# Patient Record
Sex: MALE | Race: WHITE | NOT HISPANIC OR LATINO | Employment: FULL TIME | URBAN - METROPOLITAN AREA
[De-identification: names, ages, dates, MRNs, and addresses within clinical notes are randomized per-mention and may not be internally consistent; named-entity substitution may affect disease eponyms.]

---

## 2024-12-02 ENCOUNTER — HOSPITAL ENCOUNTER (OUTPATIENT)
Dept: RADIOLOGY | Facility: CLINIC | Age: 54
Discharge: HOME | End: 2024-12-02
Payer: COMMERCIAL

## 2024-12-02 ENCOUNTER — OFFICE VISIT (OUTPATIENT)
Dept: ORTHOPEDIC SURGERY | Facility: CLINIC | Age: 54
End: 2024-12-02
Payer: COMMERCIAL

## 2024-12-02 DIAGNOSIS — M25.522 LEFT ELBOW PAIN: ICD-10-CM

## 2024-12-02 DIAGNOSIS — M25.512 ACUTE PAIN OF LEFT SHOULDER: ICD-10-CM

## 2024-12-02 DIAGNOSIS — S46.012A ROTATOR CUFF STRAIN, LEFT, INITIAL ENCOUNTER: ICD-10-CM

## 2024-12-02 DIAGNOSIS — S56.912A ELBOW STRAIN, LEFT, INITIAL ENCOUNTER: ICD-10-CM

## 2024-12-02 PROCEDURE — 73080 X-RAY EXAM OF ELBOW: CPT | Mod: LT

## 2024-12-02 PROCEDURE — 73030 X-RAY EXAM OF SHOULDER: CPT | Mod: LT

## 2024-12-02 PROCEDURE — 73030 X-RAY EXAM OF SHOULDER: CPT | Mod: LEFT SIDE | Performed by: STUDENT IN AN ORGANIZED HEALTH CARE EDUCATION/TRAINING PROGRAM

## 2024-12-02 PROCEDURE — 99214 OFFICE O/P EST MOD 30 MIN: CPT | Performed by: STUDENT IN AN ORGANIZED HEALTH CARE EDUCATION/TRAINING PROGRAM

## 2024-12-02 PROCEDURE — 99204 OFFICE O/P NEW MOD 45 MIN: CPT | Performed by: STUDENT IN AN ORGANIZED HEALTH CARE EDUCATION/TRAINING PROGRAM

## 2024-12-02 PROCEDURE — 73080 X-RAY EXAM OF ELBOW: CPT | Mod: LEFT SIDE | Performed by: STUDENT IN AN ORGANIZED HEALTH CARE EDUCATION/TRAINING PROGRAM

## 2024-12-02 RX ORDER — METHYLPREDNISOLONE 4 MG/1
TABLET ORAL
Qty: 1 EACH | Refills: 0 | Status: SHIPPED | OUTPATIENT
Start: 2024-12-02

## 2024-12-02 NOTE — PROGRESS NOTES
Acute Injury New Patient Visit    HPI: Aldo is a 54 y.o.male who presents today with new complaints of left arm injury.  States that yesterday on 12/1/2024, he was walking down the steps, fell down directly onto his elbow, and had immediate pain in the posterior elbow.  This pain radiates up towards the anterior shoulder.  He notes some popping and clicking in the shoulder.  He denies any swelling and bruising about the shoulder, but does note some mild swelling about the posterior elbow.  He denies any numbness and tingling.    Plan: For this left elbow contusion, left tricep strain, and left shoulder strain as well as left rotator cuff strain, we will place him in a sling for a few days that he has from home, start him on a Medrol Dosepak, start in physical therapy, follow-up in 2 to 3 weeks.  Continue other conservative treat measures.    Assessment:   Problem List Items Addressed This Visit    None  Visit Diagnoses       Acute pain of left shoulder        Relevant Orders    XR shoulder left 2+ views    Left elbow pain        Relevant Orders    XR elbow left 3+ views    Rotator cuff strain, left, initial encounter        Relevant Medications    methylPREDNISolone (Medrol Dospak) 4 mg tablets    Other Relevant Orders    Sling    Referral to Physical Therapy    Elbow strain, left, initial encounter        Relevant Medications    methylPREDNISolone (Medrol Dospak) 4 mg tablets    Other Relevant Orders    Sling    Referral to Physical Therapy            Diagnostics: Reviewed all relevant imaging including x-ray, MRI, CT, and US.      Procedure:  Procedures    Physical Exam:  GENERAL:  No obvious acute distress.  NEURO:  Distally neurovascularly intact.  Sensation intact to light touch.  Extremity: Left shoulder exam shows:  Skin is intact;  No erythema or warmth;  No edema or ecchymosis;  No clinical signs of infection;  Can forward flex to 180 degrees;  Abduction to 180 degrees;  External rotation from  neutral at 75 degrees;  Internal rotation at the level of T10;  POSITIVE signs of impingement with Bolanos or Neer's test;  Negative empty can test;  Negative crossarm test;  No pain over the AC joint;  Negative [place]'s test;  Negative sulcus sign;  Negative anterior apprehension's test; and  Neurovascularly intact.    Exam of the left elbow:  Skin is intact with no signs of infection;  TENDER over the distal triceps insertion at the olecranon;  No swelling or bruising;  No erythema or warmth;  No tenderness overlying the lateral epicondyle;  No tenderness overlying the medial epicondyle;  No pain with resisted extension at the wrist;  No pain with supination;  No pain with flexion;  Negative hook test;        Orders Placed This Encounter    Sling    XR shoulder left 2+ views    XR elbow left 3+ views    Referral to Physical Therapy    methylPREDNISolone (Medrol Dospak) 4 mg tablets      At the conclusion of the visit there were no further questions by the patient/family regarding their plan of care.  Patient was instructed to call or return with any issues, questions, or concerns regarding their injury and/or treatment plan described above.     12/02/24 at 4:38 PM - Edwin Da Silva DO    Office: [phone]    This note was prepared using voice recognition software.  The details of this note are correct and have been reviewed, and corrected to the best of my ability.  Some grammatical errors may persist related to the Dragon software.

## 2024-12-12 ENCOUNTER — OFFICE VISIT (OUTPATIENT)
Dept: ORTHOPEDIC SURGERY | Facility: CLINIC | Age: 54
End: 2024-12-12
Payer: COMMERCIAL

## 2024-12-12 DIAGNOSIS — S46.012A ROTATOR CUFF STRAIN, LEFT, INITIAL ENCOUNTER: Primary | ICD-10-CM

## 2024-12-12 PROCEDURE — 99213 OFFICE O/P EST LOW 20 MIN: CPT | Performed by: STUDENT IN AN ORGANIZED HEALTH CARE EDUCATION/TRAINING PROGRAM

## 2024-12-12 RX ORDER — CYCLOBENZAPRINE HCL 5 MG
5 TABLET ORAL NIGHTLY
Qty: 7 TABLET | Refills: 0 | Status: SHIPPED | OUTPATIENT
Start: 2024-12-12 | End: 2024-12-19

## 2024-12-12 RX ORDER — NAPROXEN 500 MG/1
500 TABLET ORAL
Qty: 28 TABLET | Refills: 1 | Status: SHIPPED | OUTPATIENT
Start: 2024-12-12 | End: 2025-01-09

## 2024-12-12 NOTE — PROGRESS NOTES
Established follow-up patient Visit    HPI: Aldo is a 54 y.o.male who presents today for follow-up of his left arm injury.  He states that his elbow is almost all better.  He has no pain.  He does have the occasional sharp pain across the anterior shoulder with certain movements.  He is set to start physical therapy on 12/23/2024.  He took the Medrol Dosepak.  This helped a little bit.  He wear the sling for 5 days.    On 12/2/2024, he presented with new complaints of left arm injury.  States that yesterday on 12/1/2024, he was walking down the steps, fell down directly onto his elbow, and had immediate pain in the posterior elbow.  This pain radiates up towards the anterior shoulder.  He notes some popping and clicking in the shoulder.  He denies any swelling and bruising about the shoulder, but does note some mild swelling about the posterior elbow.  He denies any numbness and tingling.    Plan: Today, on 12/12/2024, he will start physical therapy, home exercises, start cyclobenzaprine, naproxen, for his left rotator cuff tendinitis and follow-up in 4 to 5 weeks.  If he is miserable before then, I would consider corticosteroid injection.    On 12/2/2024, for this left elbow contusion, left tricep strain, and left shoulder strain as well as left rotator cuff strain, we will place him in a sling for a few days that he has from home, start him on a Medrol Dosepak, start in physical therapy, follow-up in 2 to 3 weeks.  Continue other conservative treat measures.    Assessment:   Problem List Items Addressed This Visit    None  Visit Diagnoses       Rotator cuff strain, left, initial encounter    -  Primary    Relevant Medications    naproxen (Naprosyn) 500 mg tablet    cyclobenzaprine (Flexeril) 5 mg tablet              Diagnostics: Reviewed all relevant imaging including x-ray, MRI, CT, and US.      Procedure:  Procedures    Physical Exam:  GENERAL:  No obvious acute distress.  NEURO:  Distally neurovascularly  intact.  Sensation intact to light touch.  Extremity: Left shoulder exam shows:  Skin is intact;  No erythema or warmth;  No edema or ecchymosis;  No clinical signs of infection;  Can forward flex to 180 degrees;  Abduction to 180 degrees;  External rotation from neutral at 75 degrees;  Internal rotation at the level of T10;  POSITIVE signs of impingement with Bolanos or Neer's test;  Negative empty can test;  Negative crossarm test;  No pain over the AC joint;  Negative Sandoval's test;  Negative sulcus sign;  Negative anterior apprehension's test; and  Neurovascularly intact.    Exam of the left elbow:  Skin is intact with no signs of infection;  TENDER over the distal triceps insertion at the olecranon;  No swelling or bruising;  No erythema or warmth;  No tenderness overlying the lateral epicondyle;  No tenderness overlying the medial epicondyle;  No pain with resisted extension at the wrist;  No pain with supination;  No pain with flexion;  Negative hook test;        Orders Placed This Encounter    naproxen (Naprosyn) 500 mg tablet    cyclobenzaprine (Flexeril) 5 mg tablet      At the conclusion of the visit there were no further questions by the patient/family regarding their plan of care.  Patient was instructed to call or return with any issues, questions, or concerns regarding their injury and/or treatment plan described above.     12/12/24 at 3:57 PM - Edwin Da Silva DO    Office: [phone]    This note was prepared using voice recognition software.  The details of this note are correct and have been reviewed, and corrected to the best of my ability.  Some grammatical errors may persist related to the Dragon software.

## 2024-12-20 ENCOUNTER — APPOINTMENT (OUTPATIENT)
Dept: ORTHOPEDIC SURGERY | Facility: CLINIC | Age: 54
End: 2024-12-20
Payer: COMMERCIAL

## 2024-12-23 ENCOUNTER — EVALUATION (OUTPATIENT)
Dept: PHYSICAL THERAPY | Facility: HOSPITAL | Age: 54
End: 2024-12-23
Payer: COMMERCIAL

## 2024-12-23 DIAGNOSIS — S56.912D ELBOW STRAIN, LEFT, SUBSEQUENT ENCOUNTER: Primary | ICD-10-CM

## 2024-12-23 DIAGNOSIS — S56.912A ELBOW STRAIN, LEFT, INITIAL ENCOUNTER: ICD-10-CM

## 2024-12-23 DIAGNOSIS — S46.012A ROTATOR CUFF STRAIN, LEFT, INITIAL ENCOUNTER: ICD-10-CM

## 2024-12-23 DIAGNOSIS — S46.012D ROTATOR CUFF STRAIN, LEFT, SUBSEQUENT ENCOUNTER: ICD-10-CM

## 2024-12-23 PROCEDURE — 97161 PT EVAL LOW COMPLEX 20 MIN: CPT | Mod: GP | Performed by: PHYSICAL THERAPIST

## 2024-12-23 PROCEDURE — 97110 THERAPEUTIC EXERCISES: CPT | Mod: GP | Performed by: PHYSICAL THERAPIST

## 2024-12-23 ASSESSMENT — PAIN SCALES - GENERAL: PAINLEVEL_OUTOF10: 1

## 2024-12-23 ASSESSMENT — PAIN - FUNCTIONAL ASSESSMENT: PAIN_FUNCTIONAL_ASSESSMENT: 0-10

## 2024-12-23 NOTE — PROGRESS NOTES
"Physical Therapy    Physical Therapy Evaluation and Treatment      Patient Name: Aldo Cordova \"ALEJANDRA\"  MRN: [id number]  Today's Date: 12/23/2024    Time Entry:   Time Calculation  Start Time: 0216  Stop Time: 0300  Time Calculation (min): 44 min  PT Evaluation Time Entry  PT Evaluation (Low) Time Entry: 20  PT Therapeutic Procedures Time Entry  Therapeutic Exercise Time Entry: 20                   Assessment:  This 53 yo pleasant male is present today with the diagnosis of Lt rotator cuff strain and Lt elbow strain.  He fell walking down his steps on 12/1/24 and landed directly on his elbow.  Xrays for shoulder and elbow negative.  He's having minimal shoulder/elbow pain.  Lt elbow AROM WNL's and pain free, mild pain with resisted elbow extension.  Lt shoulder AROM WFL's and relatively pain free.  He's having some Lt upper trapezius pain favoring his Lt shoulder.  Good Lt shoulder strength.  Some difficulty with his instrumental ADL's and work activities around the house.  Pt given HEP and all questions answered.        Plan:  Continue with skilled PT as needed      Current Problem:   1. Elbow strain, left, subsequent encounter  Follow Up In Physical Therapy      2. Rotator cuff strain, left, initial encounter  Referral to Physical Therapy      3. Elbow strain, left, initial encounter  Referral to Physical Therapy      4. Rotator cuff strain, left, subsequent encounter  Follow Up In Physical Therapy          Subjective  Pt reports his Lt shoulder is feeling better over the last 2 weeks.      Pain:  Pain Assessment  Pain Assessment: 0-10  0-10 (Numeric) Pain Score: 1  Pain Type: Chronic pain  Pain Location: Shoulder    Objective     AROM:  Lt shoulder AROM WFL's and relatively pain free    Strength: 4-4+/5 Lt shoulder, 5/5 Lt elbow    Posture:  Mild forward head alignment and protracted scapula    Palpation:  Tenderness to olecranon    Functional Mobility:  No difficulty with functional movements    Special Tests:  " Mild pain with Lt shoulder impingement testing.  Negative empty can    Outcome Measures:  Other Measures  Disability of Arm Shoulder Hand (DASH): 52.27%     Treatments:  Prone scapular, T's (2), Y, Rows, Extension - 20 reps  Supine scapular protraction, 30 reps *    UBE, L3, F/R, 3 minutes each way      Goals:    Abolish Lt shoulder/elbow pain.  Lt shoulder/elbow AROM pain free.  5/5 Lt shoulder strength  No difficulty with his instrumental ADL's or work activities around the house.  Independent with HEP

## 2025-01-03 ENCOUNTER — APPOINTMENT (OUTPATIENT)
Dept: PHYSICAL THERAPY | Facility: CLINIC | Age: 55
End: 2025-01-03
Payer: COMMERCIAL

## 2025-01-07 ENCOUNTER — TELEPHONE (OUTPATIENT)
Dept: PHYSICAL THERAPY | Facility: HOSPITAL | Age: 55
End: 2025-01-07
Payer: COMMERCIAL

## 2025-01-07 ENCOUNTER — APPOINTMENT (OUTPATIENT)
Dept: PHYSICAL THERAPY | Facility: HOSPITAL | Age: 55
End: 2025-01-07
Payer: COMMERCIAL

## 2025-01-07 NOTE — TELEPHONE ENCOUNTER
RCVD THIS DATE RE: UNABLE TO MAKE IT DUE TO BEING CALLED INTO WORK. RTN'D PC TO ADV APPT CANCELLED AND INFORMED PT WE CAN ALWAYS ADD ANOTHER DATE WHEN HE COMES IN FOR NEXT APPT SCHED 01/14/25 APPT CONFIRMED ON VM LEFT FOR PATIENT THIS DATE

## 2025-01-09 ENCOUNTER — APPOINTMENT (OUTPATIENT)
Dept: ORTHOPEDIC SURGERY | Facility: CLINIC | Age: 55
End: 2025-01-09
Payer: COMMERCIAL

## 2025-01-09 DIAGNOSIS — S50.12XA CONTUSION OF LEFT ELBOW AND FOREARM, INITIAL ENCOUNTER: ICD-10-CM

## 2025-01-09 DIAGNOSIS — S46.012A ROTATOR CUFF STRAIN, LEFT, INITIAL ENCOUNTER: Primary | ICD-10-CM

## 2025-01-09 DIAGNOSIS — S56.912A ELBOW STRAIN, LEFT, INITIAL ENCOUNTER: ICD-10-CM

## 2025-01-09 NOTE — PROGRESS NOTES
"  Established follow-up patient Visit    HPI: Aldo \"GALILEO" is a 54 y.o.male who presents today follow-up of his left elbow contusion and left rotator cuff tendinitis.  He states that he is mostly better.  He has no pain about the shoulder.  Only certain movements cause him some mild discomfort posteriorly on the shoulder.  He went to 1 physical therapy session and has been doing his home exercises.  He is doing better.  Regarding his elbow, he is almost 100% better, but again extreme pronation can give him some discomfort and occasionally does feel some pain if he touches the medial epicondyle in a certain way, but other than that he is back to normal at the elbow as well.  He denies any interval falls or injuries.    On 12/12/2024, he presented for follow-up of his left arm injury.  He states that his elbow is almost all better.  He has no pain.  He does have the occasional sharp pain across the anterior shoulder with certain movements.  He is set to start physical therapy on 12/23/2024.  He took the Medrol Dosepak.  This helped a little bit.  He wear the sling for 5 days.    On 12/2/2024, he presented with new complaints of left arm injury.  States that yesterday on 12/1/2024, he was walking down the steps, fell down directly onto his elbow, and had immediate pain in the posterior elbow.  This pain radiates up towards the anterior shoulder.  He notes some popping and clicking in the shoulder.  He denies any swelling and bruising about the shoulder, but does note some mild swelling about the posterior elbow.  He denies any numbness and tingling.    Plan: Today, on 1/9/2025, we will have him progressively return to his activities and workouts as tolerated over the next several weeks, and follow-up as needed.  He understands agrees with plan.  Happy to see him back with any issues or concerns.    On 12/12/2024, he will start physical therapy, home exercises, start cyclobenzaprine, naproxen, for his left rotator " cuff tendinitis and follow-up in 4 to 5 weeks.  If he is miserable before then, I would consider corticosteroid injection.    On 12/2/2024, for this left elbow contusion, left tricep strain, and left shoulder strain as well as left rotator cuff strain, we will place him in a sling for a few days that he has from home, start him on a Medrol Dosepak, start in physical therapy, follow-up in 2 to 3 weeks.  Continue other conservative treat measures.    Assessment:   Problem List Items Addressed This Visit    None  Visit Diagnoses       Rotator cuff strain, left, initial encounter    -  Primary    Elbow strain, left, initial encounter        Contusion of left elbow and forearm, initial encounter                    Diagnostics: Reviewed all relevant imaging including x-ray, MRI, CT, and US.      Procedure:  Procedures    Physical Exam:  GENERAL:  No obvious acute distress.  NEURO:  Distally neurovascularly intact.  Sensation intact to light touch.  Extremity: Left shoulder exam shows:  Skin is intact;  No erythema or warmth;  No edema or ecchymosis;  No clinical signs of infection;  Can forward flex to 180 degrees;  Abduction to 180 degrees;  External rotation from neutral at 75 degrees;  Internal rotation at the level of T10;  Very minimal POSITIVE signs of impingement with Bolanos or Neer's test;  Negative empty can test;  Negative crossarm test;  No pain over the AC joint;  Negative [place]'s test;  Negative sulcus sign;  Negative anterior apprehension's test; and  Neurovascularly intact.    Exam of the left elbow:  Skin is intact with no signs of infection;  No pain today over the distal triceps insertion at the olecranon;  No swelling or bruising;  No erythema or warmth;  No tenderness overlying the lateral epicondyle;  No tenderness overlying the medial epicondyle;  No pain with resisted extension at the wrist;  No pain with supination;  No pain with flexion;  Negative hook test;        No orders of the defined  types were placed in this encounter.     At the conclusion of the visit there were no further questions by the patient/family regarding their plan of care.  Patient was instructed to call or return with any issues, questions, or concerns regarding their injury and/or treatment plan described above.     01/09/25 at 9:08 AM - Edwin Da Silva DO    Office: [phone]    This note was prepared using voice recognition software.  The details of this note are correct and have been reviewed, and corrected to the best of my ability.  Some grammatical errors may persist related to the Dragon software.

## 2025-01-14 ENCOUNTER — APPOINTMENT (OUTPATIENT)
Dept: PHYSICAL THERAPY | Facility: HOSPITAL | Age: 55
End: 2025-01-14
Payer: COMMERCIAL

## 2025-01-21 ENCOUNTER — APPOINTMENT (OUTPATIENT)
Dept: PHYSICAL THERAPY | Facility: HOSPITAL | Age: 55
End: 2025-01-21
Payer: COMMERCIAL

## 2025-01-28 ENCOUNTER — APPOINTMENT (OUTPATIENT)
Dept: PHYSICAL THERAPY | Facility: HOSPITAL | Age: 55
End: 2025-01-28
Payer: COMMERCIAL

## 2025-02-04 ENCOUNTER — APPOINTMENT (OUTPATIENT)
Dept: PHYSICAL THERAPY | Facility: HOSPITAL | Age: 55
End: 2025-02-04
Payer: COMMERCIAL

## 2025-02-11 ENCOUNTER — APPOINTMENT (OUTPATIENT)
Dept: PHYSICAL THERAPY | Facility: HOSPITAL | Age: 55
End: 2025-02-11
Payer: COMMERCIAL